# Patient Record
(demographics unavailable — no encounter records)

---

## 2024-11-23 NOTE — HISTORY OF PRESENT ILLNESS
[Born at ___ Wks Gestation] : The patient was born at [unfilled] weeks gestation [C/S] : via  section [C/S Indication: ____] : ( [unfilled] ) [Other: _____] : at [unfilled] [BW: _____] : weight of [unfilled] [Length: _____] : length of [unfilled] [HC: _____] : head circumference of [unfilled] [DW: _____] : Discharge weight was [unfilled] [Age: ___] : [unfilled] year old mother [G: ___] : G [unfilled] [P: ___] : P [unfilled] [AMA] : AMA [Hepatitis B Vaccine Given] : Hepatitis B vaccine given [TcB: _____] : Transcutaneous Bilirubin [unfilled] mg/dL [Breast milk] : breast milk [Formula ___ oz/feed] : [unfilled] oz of formula per feed [Normal] : Normal [No] : No cigarette smoke exposure [Exposure to electronic nicotine delivery system] : No exposure to electronic nicotine delivery system [Water heater temperature set at <120 degrees F] : Water heater temperature set at <120 degrees F [Rear facing car seat in back seat] : Rear facing car seat in back seat [Carbon Monoxide Detectors] : Carbon monoxide detectors at home [Smoke Detectors] : Smoke detectors at home. [de-identified] : 11/21/2024 [NO] : No

## 2024-11-23 NOTE — PHYSICAL EXAM

## 2024-11-23 NOTE — DISCUSSION/SUMMARY
[Normal Growth] : growth [Normal Development] : developmental [No Elimination Concerns] : elimination [Continue Regimen] : feeding [No Skin Concerns] : skin [Normal Sleep Pattern] : sleep [None] : no known medical problems [Anticipatory Guidance Given] : Anticipatory guidance addressed as per the history of present illness section [No Vaccines] : no vaccines needed [No Medications] : ~He/She~ is not on any medications [Parent/Guardian] : Parent/Guardian [] : The components of the vaccine(s) to be administered today are listed in the plan of care. The disease(s) for which the vaccine(s) are intended to prevent and the risks have been discussed with the caretaker.  The risks are also included in the appropriate vaccination information statements which have been provided to the patient's caregiver.  The caregiver has given consent to vaccinate. [FreeTextEntry1] : Recommend exclusive breastfeeding, 8-12 feedings per day. Mother should continue prenatal vitamins and avoid alcohol. If formula is needed, recommend iron-fortified formulations every 2-3 hrs. When in car, patient should be in rear-facing car seat in back seat. Air dry umbillical stump. Put baby to sleep on back, in own crib with no loose or soft bedding. Limit baby's exposure to others, especially those with fever or unknown vaccine status. TCB 1.1 mother received RSV vaccine during pregnancy f/u 1 week weight check, sooner if concerns Discussed signs/symptoms that would require immediate care.  Mother expressed understanding.

## 2024-11-29 NOTE — HISTORY OF PRESENT ILLNESS
[de-identified] : WEIGHT RECHECK [FreeTextEntry6] : weight recheck BF and formula feeding, voiding and stooling well

## 2024-11-29 NOTE — DISCUSSION/SUMMARY
[FreeTextEntry1] : Discussed with parent(s) appropriate expectations regarding feeding, jaundice, weight loss/gain and urine/stool outputs.  Patient currently within normal expectations Urinary/stool outputs within expected range  Parents supported and questions/concerns addressed Reinforced back to sleep Recheck in office: 1 month PE, sooner for concerns

## 2024-12-09 NOTE — DISCUSSION/SUMMARY
[FreeTextEntry1] : doing well   exam  normal   clinically  looks  good  in   no  wheezing  in   no distress   most  likely  viral  or  normal   congestion  follow   up in  one week  for  weight  check.

## 2024-12-09 NOTE — PHYSICAL EXAM
[No Acute Distress] : no acute distress [Alert] : alert [Tired appearing] : not tired appearing [Irritable] : not irritable [Consolable] : consolable [Playful] : playful [Toxic] : not toxic [Stridor] : no stridor [NL] : warm, clear

## 2024-12-16 NOTE — HISTORY OF PRESENT ILLNESS
[de-identified] : weight recheck  [FreeTextEntry6] : weight check BF exclusively, voiding and stooling well

## 2024-12-16 NOTE — HISTORY OF PRESENT ILLNESS
[de-identified] : weight recheck  [FreeTextEntry6] : weight check BF exclusively, voiding and stooling well

## 2024-12-27 NOTE — DISCUSSION/SUMMARY
[Normal Growth] : growth [Normal Development] : development  [No Elimination Concerns] : elimination [Continue Regimen] : feeding [No Skin Concerns] : skin [Normal Sleep Pattern] : sleep [None] : no medical problems [Anticipatory Guidance Given] : Anticipatory guidance addressed as per the history of present illness section [Age Approp Vaccines] : Age appropriate vaccines administered [No Medications] : ~He/She~ is not on any medications [Parent/Guardian] : Parent/Guardian [FreeTextEntry1] : Recommend exclusive breastfeeding, 8-12 feedings per day. Mother should continue prenatal vitamins and avoid alcohol. If formula is needed, recommend iron-fortified formulations, 2-4 oz every 2-3 hrs. When in car, patient should be in rear-facing car seat in back seat. Put baby to sleep on back, in own crib with no loose or soft bedding. Help baby to develop sleep and feeding routines. May offer pacifier if needed. Start tummy time when awake. Limit baby's exposure to others, especially those with fever or unknown vaccine status. Parents counseled to call if rectal temperature >100.4 degrees F. slow weight gain - will add formula supplementation f/u 2 weeks weight check Discussed signs/symptoms that would require immediate care.  Mother expressed understanding.

## 2024-12-27 NOTE — HISTORY OF PRESENT ILLNESS
[Breast milk] : breast milk [Expressed Breast milk ___oz/feed] : [unfilled] oz of expressed breast milk per feed [Normal] : Normal [No] : No cigarette smoke exposure [Exposure to electronic nicotine delivery system] : No exposure to electronic nicotine delivery system [Water heater temperature set at <120 degrees F] : Water heater temperature set at <120 degrees F [Rear facing car seat in back seat] : Rear facing car seat in back seat [Carbon Monoxide Detectors] : Carbon monoxide detectors at home [Smoke Detectors] : Smoke detectors at home. [At risk for exposure to TB] : Not at risk for exposure to Tuberculosis  [NO] : No

## 2024-12-27 NOTE — DEVELOPMENTAL MILESTONES
[Passed] : passed [Normal Development] : Normal Development [None] : none [FreeTextEntry1] : Prone: turns head, clearing surface, chin upVentral suspension:  Lifts head to plane of bodySupine:  Relaxed, in TNA,  head lags on pull to sittingVisual/social:  Watches person, follows moving object, movements in nakul, may smile.Reflex:  [place] reflexes persist

## 2024-12-27 NOTE — PHYSICAL EXAM

## 2025-01-11 NOTE — HISTORY OF PRESENT ILLNESS
[de-identified] : Weight recheck. [FreeTextEntry6] : weight recheck - BF and formula supplementation, voiding and stooling well

## 2025-01-11 NOTE — DISCUSSION/SUMMARY
[FreeTextEntry1] : Great weight gain Discussed with parent(s) appropriate expectations regarding feeding, jaundice, weight loss/gain and urine/stool outputs.  Patient currently within normal expectations Urinary/stool outputs within expected range  Parents supported and questions/concerns addressed Reinforced back to sleep Recheck in office: 2 month PE, sooner for concerns

## 2025-02-03 NOTE — DEVELOPMENTAL MILESTONES
[Normal Development] : Normal Development [None] : none [FreeTextEntry1] : Prone:  Lifts head and chestVentral suspension:  Sustains head in plane of bodySupine:  TNA persists,  head lags on pull to sitting,  follows moving object 180 degrees visuallyVisual/social:  Smiles on social contact,  attends to voice and coosReflex:   reflexes persist

## 2025-02-03 NOTE — PHYSICAL EXAM
[Alert] : alert [Acute Distress] : no acute distress [Normocephalic] : normocephalic [Flat Open Anterior [place]] : flat open anterior fontanelle [PERRL] : PERRL [Red Reflex Bilateral] : red reflex bilateral [Normally Placed Ears] : normally placed ears [Auricles Well Formed] : auricles well formed [Clear Tympanic membranes] : clear tympanic membranes [Light reflex present] : light reflex present [Bony landmarks visible] : bony landmarks visible [Discharge] : no discharge [Nares Patent] : nares patent [Palate Intact] : palate intact [Uvula Midline] : uvula midline [Supple, full passive range of motion] : supple, full passive range of motion [Palpable Masses] : no palpable masses [Symmetric Chest Rise] : symmetric chest rise [Clear to Auscultation Bilaterally] : clear to auscultation bilaterally [Regular Rate and Rhythm] : regular rate and rhythm [S1, S2 present] : S1, S2 present [Murmurs] : no murmurs [+2 Femoral Pulses] : +2 femoral pulses [Soft] : soft [Tender] : nontender [Distended] : not distended [Bowel Sounds] : bowel sounds present [Hepatomegaly] : no hepatomegaly [Splenomegaly] : no splenomegaly [Normal external genitailia] : normal external genitalia [Clitoromegaly] : no clitoromegaly [Patent Vagina] : vagina patent [Normally Placed] : normally placed [No Abnormal Lymph Nodes Palpated] : no abnormal lymph nodes palpated [Bui-Ortolani] : negative Bui-Ortolani [Symmetric Flexed Extremities] : symmetric flexed extremities [Spinal Dimple] : no spinal dimple [Tuft of Hair] : no tuft of hair [Startle Reflex] : startle reflex present [Suck Reflex] : suck reflex present [Rooting] : rooting reflex present [Palmar Grasp] : palmar grasp reflex present [Plantar Grasp] : plantar grasp reflex present [Symmetric [place]] : symmetric [place] [Rash and/or lesion present] : no rash/lesion

## 2025-02-03 NOTE — PHYSICAL EXAM
[Alert] : alert [Acute Distress] : no acute distress [Normocephalic] : normocephalic [Flat Open Anterior [place]] : flat open anterior fontanelle [PERRL] : PERRL [Red Reflex Bilateral] : red reflex bilateral [Normally Placed Ears] : normally placed ears [Auricles Well Formed] : auricles well formed [Clear Tympanic membranes] : clear tympanic membranes [Light reflex present] : light reflex present [Bony landmarks visible] : bony landmarks visible [Discharge] : no discharge [Nares Patent] : nares patent [Palate Intact] : palate intact [Uvula Midline] : uvula midline [Supple, full passive range of motion] : supple, full passive range of motion [Palpable Masses] : no palpable masses [Symmetric Chest Rise] : symmetric chest rise [Clear to Auscultation Bilaterally] : clear to auscultation bilaterally [Regular Rate and Rhythm] : regular rate and rhythm [S1, S2 present] : S1, S2 present [Murmurs] : no murmurs [+2 Femoral Pulses] : +2 femoral pulses [Soft] : soft [Tender] : nontender [Distended] : not distended [Bowel Sounds] : bowel sounds present [Hepatomegaly] : no hepatomegaly [Splenomegaly] : no splenomegaly [Normal external genitailia] : normal external genitalia [Clitoromegaly] : no clitoromegaly [Patent Vagina] : vagina patent [Normally Placed] : normally placed [No Abnormal Lymph Nodes Palpated] : no abnormal lymph nodes palpated [Bui-Ortolani] : negative Bui-Ortolani [Symmetric Flexed Extremities] : symmetric flexed extremities [Spinal Dimple] : no spinal dimple [Tuft of Hair] : no tuft of hair [Startle Reflex] : startle reflex present [Suck Reflex] : suck reflex present [Rooting] : rooting reflex present [Palmar Grasp] : palmar grasp reflex present [Plantar Grasp] : plantar grasp reflex present [Symmetric Briana] : symmetric [place] [Rash and/or lesion present] : no rash/lesion

## 2025-02-03 NOTE — HISTORY OF PRESENT ILLNESS
[Expressed Breast milk ___oz/feed] : [unfilled] oz of expressed breast milk per feed [Formula ___ oz/feed] : [unfilled] oz of formula per feed [Normal] : Normal [No] : No cigarette smoke exposure [Exposure to electronic nicotine delivery system] : No exposure to electronic nicotine delivery system [Water heater temperature set at <120 degrees F] : Water heater temperature set at <120 degrees F [Rear facing car seat in back seat] : Rear facing car seat in back seat [Carbon Monoxide Detectors] : Carbon monoxide detectors at home [Smoke Detectors] : Smoke detectors at home. [At risk for exposure to TB] : Not at risk for exposure to Tuberculosis

## 2025-03-21 NOTE — HISTORY OF PRESENT ILLNESS
[Well-balanced] : well-balanced [Breast milk] : breast milk [Formula ___ oz/feed] : [unfilled] oz of formula per feed [Normal] : Normal [No] : No cigarette smoke exposure [Water heater temperature set at <120 degrees F] : Water heater temperature set at <120 degrees F [Rear facing car seat in back seat] : Rear facing car seat in back seat [Carbon Monoxide Detectors] : Carbon monoxide detectors at home [Smoke Detectors] : Smoke detectors at home. [NO] : No

## 2025-03-21 NOTE — PHYSICAL EXAM
[Alert] : alert [Acute Distress] : no acute distress [Normocephalic] : normocephalic [Flat Open Anterior [place]] : flat open anterior fontanelle [Red Reflex] : red reflex bilateral [PERRL] : PERRL [Normally Placed Ears] : normally placed ears [Auricles Well Formed] : auricles well formed [Clear Tympanic membranes] : clear tympanic membranes [Light reflex present] : light reflex present [Bony landmarks visible] : bony landmarks visible [Discharge] : no discharge [Nares Patent] : nares patent [Palate Intact] : palate intact [Uvula Midline] : uvula midline [Palpable Masses] : no palpable masses [Symmetric Chest Rise] : symmetric chest rise [Clear to Auscultation Bilaterally] : clear to auscultation bilaterally [Regular Rate and Rhythm] : regular rate and rhythm [S1, S2 present] : S1, S2 present [Murmurs] : no murmurs [+2 Femoral Pulses] : (+) 2 femoral pulses [Soft] : soft [Tender] : nontender [Distended] : nondistended [Bowel Sounds] : bowel sounds present [Hepatomegaly] : no hepatomegaly [Splenomegaly] : no splenomegaly [Normal External Genitalia] : normal external genitalia [Clitoromegaly] : no clitoromegaly [Normal Vaginal Introitus] : normal vaginal introitus [Patent] : patent [Normally Placed] : normally placed [No Abnormal Lymph Nodes Palpated] : no abnormal lymph nodes palpated [Bui-Ortolani] : negative Bui-Ortolani [Allis Sign] : negative Allis sign [Spinal Dimple] : no spinal dimple [Tuft of Hair] : no tuft of hair [Startle Reflex] : startle reflex present [Plantar Grasp] : plantar grasp reflex present [Symmetric Briana] : symmetric [place] [Rash or Lesions] : no rash/lesions

## 2025-04-05 NOTE — DISCUSSION/SUMMARY
[FreeTextEntry1] : 4 mos old with uri/abom/fever rvp to lab abx as pres supp care cool mist hum ns gtts/sx  elevate 30 degrees bf frequently probiotics drops notify office if s/s persist or worsen r/c in 2 wks/prn [] : The components of the vaccine(s) to be administered today are listed in the plan of care. The disease(s) for which the vaccine(s) are intended to prevent and the risks have been discussed with the caretaker.  The risks are also included in the appropriate vaccination information statements which have been provided to the patient's caregiver.  The caregiver has given consent to vaccinate.

## 2025-04-05 NOTE — REVIEW OF SYSTEMS
[Fussy] : fussy [Eye Discharge] : eye discharge [Increased Lacrimation] : increased lacrimation [Nasal Discharge] : nasal discharge [Nasal Congestion] : nasal congestion [Negative] : Genitourinary

## 2025-04-05 NOTE — HISTORY OF PRESENT ILLNESS
[de-identified] : Cough, congestion, fever [FreeTextEntry6] : C/o cough, congestion, fever, started Wednesday Fever 101 at home given Tylenol last night.  feed well, wetting diapers

## 2025-04-05 NOTE — PHYSICAL EXAM
[No Acute Distress] : acute distress [Alert] : alert [Consolable] : consolable [Normocephalic] : normocephalic [EOMI] : grossly EOMI [Clear Effusion] : clear effusion [Erythema] : erythema [Clear Rhinorrhea] : clear rhinorrhea [Mucoid Discharge] : mucoid discharge [Supple] : supple [FROM] : full passive range of motion [Clear to Auscultation Bilaterally] : clear to auscultation bilaterally [Regular Rate and Rhythm] : regular rate and rhythm [Normal S1, S2 audible] : normal S1, S2 audible [Murmurs] : no murmurs [Soft] : soft [Tender] : nontender [Distended] : nondistended [Normal Bowel Sounds] : normal bowel sounds [Hepatosplenomegaly] : no hepatosplenomegaly [No Abnormal Lymph Nodes Palpated] : no abnormal lymph nodes palpated [Moves All Extremities x 4] : moves all extremities x4 [Warm, Well Perfused x4] : warm, well perfused x4 [Capillary Refill <2s] : capillary refill < 2s [Normotonic] : normotonic [NL] : warm, clear [Warm] : warm [Clear] : clear [FreeTextEntry2] : afof [FreeTextEntry7] : aerating well, no wheeze no cough no ret or nasal flaring, no distress

## 2025-04-29 NOTE — PHYSICAL EXAM
[No Acute Distress] : no acute distress [Alert] : alert [Consolable] : consolable [Normocephalic] : normocephalic [EOMI] : grossly EOMI [Increased Tearing] : increased tearing [Clear Rhinorrhea] : clear rhinorrhea [Supple] : supple [FROM] : full passive range of motion [Clear to Auscultation Bilaterally] : clear to auscultation bilaterally [Regular Rate and Rhythm] : regular rate and rhythm [Normal S1, S2 audible] : normal S1, S2 audible [Murmurs] : no murmurs [Soft] : soft [Tender] : nontender [Distended] : nondistended [Normal Bowel Sounds] : normal bowel sounds [Hepatosplenomegaly] : no hepatosplenomegaly [No Abnormal Lymph Nodes Palpated] : no abnormal lymph nodes palpated [Moves All Extremities x 4] : moves all extremities x4 [Warm, Well Perfused x4] : warm, well perfused x4 [Capillary Refill <2s] : capillary refill > 2s [Normotonic] : normotonic [NL] : warm, clear [Warm] : warm [Clear] : clear [FreeTextEntry4] : UPPER AIRWAY CONGESTION NOTED [FreeTextEntry7] : AERATING WELL, NO WHEEZE NO RET NO DISTRESS

## 2025-04-29 NOTE — REVIEW OF SYSTEMS
[Fever] : fever [Increased Lacrimation] : increased lacrimation [Nasal Discharge] : nasal discharge [Nasal Congestion] : nasal congestion [Cough] : cough [Negative] : Genitourinary

## 2025-04-29 NOTE — DISCUSSION/SUMMARY
[FreeTextEntry1] : 5 MOS OLD WITH ACUTE URI RVP TO LAB SUPP CARE COOL MIST HUM NS NEB 3-4 X A DAY AS PRES INCREASE CLEAR FLUIDS, PEDIALYTE NOTIFY OFFICE IF S/S PERSIST OR WORSEN [] : The components of the vaccine(s) to be administered today are listed in the plan of care. The disease(s) for which the vaccine(s) are intended to prevent and the risks have been discussed with the caretaker.  The risks are also included in the appropriate vaccination information statements which have been provided to the patient's caregiver.  The caregiver has given consent to vaccinate.

## 2025-04-29 NOTE — HISTORY OF PRESENT ILLNESS
[de-identified] : INTERMITTENT LOW GRADE FEVER, TMAX 101 LAST NT;  COUGH, NASAL CONGESTION  FOR 4 DAYS. WORRIED ABOUT EARS BEING INFECTED

## 2025-05-10 NOTE — DISCUSSION/SUMMARY
[FreeTextEntry1] : Post exposure prophylaxis for pertussis azithro x 5 days PCR sent - will call with results Discussed signs/symptoms that would require immediate care.  Mother expressed understanding.

## 2025-05-10 NOTE — HISTORY OF PRESENT ILLNESS
[de-identified] : Possible whooping cough. [FreeTextEntry6] : As per parents cough, congestion, started 2-3 days ago, no fever. got note sent home from school yesterday that there was a case of whooping cough in her infant  class this week

## 2025-06-09 NOTE — HISTORY OF PRESENT ILLNESS
[Well-balanced] : well-balanced [Formula ___ oz/feed] : [unfilled] oz of formula per feed [Vegetables] : vegetables [Fruits] : fruits [Meat] : meat [Cereal] : cereal [Normal] : Normal [No] : No cigarette smoke exposure [Exposure to electronic nicotine delivery system] : No exposure to electronic nicotine delivery system [Water heater temperature set at <120 degrees F] : Water heater temperature set at <120 degrees F [Rear facing car seat in back seat] : Rear facing car seat in back seat [Carbon Monoxide Detectors] : Carbon monoxide detectors at home [Smoke Detectors] : Smoke detectors at home. [NO] : No

## 2025-06-09 NOTE — DISCUSSION/SUMMARY
[Normal Growth] : growth [Normal Development] : development [None] : No medical problems [No Feeding Concerns] : feeding [No Elimination Concerns] : elimination [No Skin Concerns] : skin [Normal Sleep Pattern] : sleep [No Medications] : ~He/She~ is not on any medications [Parent/Guardian] : parent/guardian [] : The components of the vaccine(s) to be administered today are listed in the plan of care. The disease(s) for which the vaccine(s) are intended to prevent and the risks have been discussed with the caretaker.  The risks are also included in the appropriate vaccination information statements which have been provided to the patient's caregiver.  The caregiver has given consent to vaccinate. [FreeTextEntry1] : Recommend breastfeeding, 8-12 feedings per day. If formula is needed, 2-4 oz every 3-4 hrs. Introduce single-ingredient foods rich in iron, one at a time. Incorporate up to 4 oz of fluorinated water daily in a sippy cup. When teeth erupt wipe daily with washcloth. When in car, patient should be in rear-facing car seat in back seat. Put baby to sleep on back, in own crib with no loose or soft bedding. Lower crib mattress. Help baby to maintain sleep and feeding routines. May offer pacifier if needed. Continue tummy time when awake. Ensure home is safe since baby is now more mobile. Do not use infant walker. Read aloud to baby.

## 2025-06-09 NOTE — DEVELOPMENTAL MILESTONES
[Normal Development] : Normal Development [None] : none [FreeTextEntry1] : Prone:  Rolls over, pivots, may crawl, or creep-crawl Supine:  Lifts head,  rolls over,  squirming movementsSitting/Standing:  Sits with pelvic support,  back rounded,  leans forward on handsManipulation:  Rakes at pellet,  turns body to extend reach,  grasps and transfersSocial:  Prefers mother,  repetitive vowel sounds,  responds to emotional content of interaction,  imitates banging

## 2025-06-09 NOTE — PHYSICAL EXAM
[Acute Distress] : no acute distress [Alert] : alert [Normocephalic] : normocephalic [Flat Open Anterior [place]] : flat open anterior fontanelle [PERRL] : PERRL [Red Reflex] : red reflex bilateral [Auricles Well Formed] : auricles well formed [Normally Placed Ears] : normally placed ears [Light reflex present] : light reflex present [Clear Tympanic membranes] : clear tympanic membranes [Bony landmarks visible] : bony landmarks visible [Discharge] : no discharge [Palate Intact] : palate intact [Nares Patent] : nares patent [Tooth Eruption] : no tooth eruption [Uvula Midline] : uvula midline [Palpable Masses] : no palpable masses [Supple, full passive range of motion] : supple, full passive range of motion [Clear to Auscultation Bilaterally] : clear to auscultation bilaterally [Symmetric Chest Rise] : symmetric chest rise [Regular Rate and Rhythm] : regular rate and rhythm [Murmurs] : no murmurs [S1, S2 present] : S1, S2 present [+2 Femoral Pulses] : (+) 2 femoral pulses [Soft] : soft [Tender] : nontender [Distended] : nondistended [Bowel Sounds] : bowel sounds present [Hepatomegaly] : no hepatomegaly [Splenomegaly] : no splenomegaly [Normal External Genitalia] : normal external genitalia [Clitoromegaly] : no clitoromegaly [Normal Vaginal Introitus] : normal vaginal introitus [Patent] : patent [Normally Placed] : normally placed [No Abnormal Lymph Nodes Palpated] : no abnormal lymph nodes palpated [Bui-Ortolani] : negative Bui-Ortolani [Allis Sign] : negative Allis sign [Spinal Dimple] : no spinal dimple [Symmetric Buttocks Creases] : symmetric buttocks creases [Plantar Grasp] : plantar grasp reflex present [Tuft of Hair] : no tuft of hair [Rash or Lesions] : no rash/lesions [Cranial Nerves Grossly Intact] : cranial nerves grossly intact

## 2025-06-26 NOTE — HISTORY OF PRESENT ILLNESS
[de-identified] : C/O DAVID  [FreeTextEntry6] : PER DAD,  HAS COSAKIE GOING AROUND AND SAW DOT ON PT, WANTED HER CHECKED OUT

## 2025-06-26 NOTE — HISTORY OF PRESENT ILLNESS
[de-identified] : C/O DAVID  [FreeTextEntry6] : PER DAD,  HAS COSAKIE GOING AROUND AND SAW DOT ON PT, WANTED HER CHECKED OUT

## 2025-07-15 NOTE — HISTORY OF PRESENT ILLNESS
[de-identified] : C/O B/L EAR PULLING AND CONGESTION [FreeTextEntry6] : PER MOM, NO FEVERS NKDA  Yes

## 2025-07-15 NOTE — REVIEW OF SYSTEMS
[Fussy] : fussy [Ear Tugging] : ear tugging [Nasal Congestion] : nasal congestion [Negative] : Genitourinary

## 2025-07-15 NOTE — DISCUSSION/SUMMARY
[FreeTextEntry1] : 7 mos old with alom abx as pres supp care probiotics r/c in 2 wks/prn notify office if s/s persist or worsen

## 2025-07-15 NOTE — PHYSICAL EXAM
[No Acute Distress] : no acute distress [Alert] : alert [Consolable] : consolable [Playful] : playful [Normocephalic] : normocephalic [EOMI] : grossly EOMI [Erythema] : erythema [Clear Rhinorrhea] : clear rhinorrhea [Supple] : supple [FROM] : full passive range of motion [Clear to Auscultation Bilaterally] : clear to auscultation bilaterally [Regular Rate and Rhythm] : regular rate and rhythm [Normal S1, S2 audible] : normal S1, S2 audible [Murmurs] : no murmurs [Soft] : soft [Tender] : nontender [Distended] : nondistended [Normal Bowel Sounds] : normal bowel sounds [Hepatosplenomegaly] : no hepatosplenomegaly [No Abnormal Lymph Nodes Palpated] : no abnormal lymph nodes palpated [Moves All Extremities x 4] : moves all extremities x4 [Warm, Well Perfused x4] : warm, well perfused x4 [Capillary Refill <2s] : capillary refill < 2s [Normotonic] : normotonic [NL] : warm, clear [Warm] : warm [Clear] : clear [FreeTextEntry3] : left tm dull and red

## 2025-07-26 NOTE — HISTORY OF PRESENT ILLNESS
[de-identified] : RECHECK EARS  [FreeTextEntry6] : PER MOM, PT DOING WELL, STILL A LITTLE CONGESTED completed antibiotics as prescribed  no fevers

## 2025-07-26 NOTE — DISCUSSION/SUMMARY
[FreeTextEntry1] : AOM resolved  Teething Recommend acetaminophen or ibuprofen prn. Offer teething rings. Apply cold compress to gums.  return prn Discussed signs/symptoms that would require immediate care.  Mother expressed understanding.